# Patient Record
Sex: FEMALE
[De-identification: names, ages, dates, MRNs, and addresses within clinical notes are randomized per-mention and may not be internally consistent; named-entity substitution may affect disease eponyms.]

---

## 2020-03-27 ENCOUNTER — HOSPITAL ENCOUNTER (INPATIENT)
Dept: HOSPITAL 25 - MCHOBOUT | Age: 30
LOS: 2 days | Discharge: HOME | DRG: 560 | End: 2020-03-29
Attending: MIDWIFE | Admitting: MIDWIFE
Payer: COMMERCIAL

## 2020-03-27 DIAGNOSIS — O32.6XX0: ICD-10-CM

## 2020-03-27 DIAGNOSIS — O48.0: Primary | ICD-10-CM

## 2020-03-27 DIAGNOSIS — Z3A.40: ICD-10-CM

## 2020-03-27 LAB
BASOPHILS # BLD AUTO: 0 10^3/UL (ref 0–0.2)
EOSINOPHIL # BLD AUTO: 0.1 10^3/UL (ref 0–0.6)
HCT VFR BLD AUTO: 39 % (ref 35–47)
HGB BLD-MCNC: 13.1 G/DL (ref 12–16)
LYMPHOCYTES # BLD AUTO: 1.3 10^3/UL (ref 1–4.8)
MCH RBC QN AUTO: 31 PG (ref 27–31)
MCHC RBC AUTO-ENTMCNC: 34 G/DL (ref 31–36)
MCV RBC AUTO: 91 FL (ref 80–97)
MONOCYTES # BLD AUTO: 0.6 10^3/UL (ref 0–0.8)
NEUTROPHILS # BLD AUTO: 7.6 10^3/UL (ref 1.5–7.7)
NRBC # BLD AUTO: 0 10^3/UL
NRBC BLD QL AUTO: 0
PLATELET # BLD AUTO: 171 10^3/UL (ref 150–450)
RBC # BLD AUTO: 4.29 10^6 /UL (ref 3.7–4.87)
WBC # BLD AUTO: 9.5 10^3/UL (ref 3.5–10.8)

## 2020-03-27 PROCEDURE — 86901 BLOOD TYPING SEROLOGIC RH(D): CPT

## 2020-03-27 PROCEDURE — 86850 RBC ANTIBODY SCREEN: CPT

## 2020-03-27 PROCEDURE — G0480 DRUG TEST DEF 1-7 CLASSES: HCPCS

## 2020-03-27 PROCEDURE — 86900 BLOOD TYPING SEROLOGIC ABO: CPT

## 2020-03-27 PROCEDURE — 80307 DRUG TEST PRSMV CHEM ANLYZR: CPT

## 2020-03-27 PROCEDURE — 85025 COMPLETE CBC W/AUTO DIFF WBC: CPT

## 2020-03-27 PROCEDURE — 0KQM0ZZ REPAIR PERINEUM MUSCLE, OPEN APPROACH: ICD-10-PCS | Performed by: MIDWIFE

## 2020-03-27 PROCEDURE — 36415 COLL VENOUS BLD VENIPUNCTURE: CPT

## 2020-03-27 RX ADMIN — SODIUM CHLORIDE, SODIUM LACTATE, POTASSIUM CHLORIDE, AND CALCIUM CHLORIDE SCH MLS/HR: 600; 310; 30; 20 INJECTION, SOLUTION INTRAVENOUS at 16:14

## 2020-03-27 RX ADMIN — SODIUM CHLORIDE, SODIUM LACTATE, POTASSIUM CHLORIDE, AND CALCIUM CHLORIDE SCH MLS/HR: 600; 310; 30; 20 INJECTION, SOLUTION INTRAVENOUS at 20:34

## 2020-03-27 NOTE — HP
General Information





- Reason for Visit


Contractions





- General Information


Maternal Age: 29


Grav: 3


Para: 1


SAB: 1


IEA: 0





Estimated Due Date: 20


Determined By: Early Ultrasound


Gestational Age in Weeks/Days: 40 6/7 weeks


Maternal Blood Type and Rh: O Positive





- Results this Pregnancy


Serology/RPR Result: Non-Reactive


Rubella Result: Immune


HBsAg Result: Negative


HIV Result: Negative


GBS Culture Result: Negative





Past Medical History


Delivery History: Hx Uncomplicated Vaginal Delivery


Pertinent Past Medical History: Non-Contributory


Pertinent Past Surgical History: None


Pertinent Family History: Non-Contributory





- Antepartal Records


Antepartal Records: Reviewed, Pregnancy Complicated by: - ESL (good language 

skills), Varicella nonimmune





Review of Systems


Constitutional: Uncomfortable


CV Complaint: No


Respiratory: Shortness of Breath: No


Gastrointestinal: No Nausea/Vomiting, Normal Bowel Movement


Genitourinary: No Dysuria, No Leaking Fluid


Musculoskeletal: Back Pain, Contractions


Neurological: No Headache, No Visual Changes


Fetal Movement: Normal





Exam


Allergies/Adverse Reactions: 


Allergies





No Known Allergies Allergy (Verified 20 15:01)


 








B/P: 109/72, P: 85, R: 18, T: 97.6





- Measurements


Height: 5 ft 6.14 in


Weight: 168 lb


Weight in lbs: 168.028251


Body Mass Index (BMI): 27.0


Pre-Pregnancy Weight: 130 lb


Weight Gained This Pregnancy: 38 lbs and 0 ozs





- Exam


Breast: Breast Exam Deferred


CVA: No CVA Tenderness


Extremities: No Edema


Heart: Normal Rhythm/Heart Sounds


HEENT: No Significant Findings


Lungs: Clear Bilaterally


Reflexes: DTR 2+


Thyroid: No Thyromegaly





- Abdominal Exam


Abdomen Exam: Non-Tender, Fundal Height Consistent with Dates





- Ultrasound/Biophysical Profile


Ultrasound Status: Not Done





Targeted Exam Findings


See L&D Outpatient Visit Provider Note for Findings: N/A


Estimated Fetal Weight: 7 lb by leopold's


Cervical Exam: 4cm


Effacement: 80%


Station: -1


Presenting Part: Vertex


Membrane Status: Intact


Bleeding/Discharge: Bloody Show





EFM Findings





- External Fetal Monitor Findings


Baseline Fetal Heart Rate: 135


External Fetal Monitor Findings: Accelerations Present, No Pattern of Variable 

or Late Decelerations, Variability Moderate, Baseline Stable


Contractions: Regular, Moderate, 45-90 Seconds


Contraction Frequency: 2-5 min





Assessment/Plan





- Assessment


A: IUP at 40 6/7 weeks


     Category I FHR, no evidence of fetal metabolic acidemia


     GBS negative


     Labor





P: Desires epidural, will draw labs and start IV for bolus


    Plan to page anesthesiologist once labs return


    Reassess PRN


    Anticipate SVB





- Plan


Plan: Admit - Anticipate Vaginal Delivery





- Date/Time of Admission


Date of Admission: 20


Time of Admission: 15:30

## 2020-03-28 LAB
BASOPHILS # BLD AUTO: 0.1 10^3/UL (ref 0–0.2)
EOSINOPHIL # BLD AUTO: 0 10^3/UL (ref 0–0.6)
HCT VFR BLD AUTO: 36 % (ref 35–47)
HGB BLD-MCNC: 11.9 G/DL (ref 12–16)
LYMPHOCYTES # BLD AUTO: 1.1 10^3/UL (ref 1–4.8)
MCH RBC QN AUTO: 31 PG (ref 27–31)
MCHC RBC AUTO-ENTMCNC: 33 G/DL (ref 31–36)
MCV RBC AUTO: 92 FL (ref 80–97)
MONOCYTES # BLD AUTO: 0.9 10^3/UL (ref 0–0.8)
NEUTROPHILS # BLD AUTO: 12.2 10^3/UL (ref 1.5–7.7)
NRBC # BLD AUTO: 0 10^3/UL
NRBC BLD QL AUTO: 0
PLATELET # BLD AUTO: 143 10^3/UL (ref 150–450)
RBC # BLD AUTO: 3.88 10^6 /UL (ref 3.7–4.87)
WBC # BLD AUTO: 14.3 10^3/UL (ref 3.5–10.8)

## 2020-03-28 RX ADMIN — IBUPROFEN SCH MG: 600 TABLET, FILM COATED ORAL at 18:32

## 2020-03-28 RX ADMIN — IBUPROFEN SCH: 600 TABLET, FILM COATED ORAL at 18:33

## 2020-03-28 RX ADMIN — IBUPROFEN SCH MG: 600 TABLET, FILM COATED ORAL at 01:09

## 2020-03-28 RX ADMIN — IBUPROFEN SCH: 600 TABLET, FILM COATED ORAL at 07:58

## 2020-03-28 RX ADMIN — DOCUSATE SODIUM SCH MG: 100 CAPSULE, LIQUID FILLED ORAL at 07:59

## 2020-03-28 RX ADMIN — DOCUSATE SODIUM SCH MG: 100 CAPSULE, LIQUID FILLED ORAL at 21:04

## 2020-03-28 RX ADMIN — DOCUSATE SODIUM SCH MG: 100 CAPSULE, LIQUID FILLED ORAL at 14:35

## 2020-03-28 NOTE — PROCNOTE
[facility] OB: Delivery Note





- Delivery


  ** A


Date of Birth: [birth year]


Time of Birth: 23:12


 Sex: Female


 Weight at Birth: 7 lb 10.224 oz


Apgar Score 1 Minute: 9


Apgar Score 5 Minutes: 9


Gestational Age in Weeks and Days at Delivery: 40 Weeks and 6 Days


Delivery Method: Spontaneous Vaginal


Labor: Spontaneous


Did Patient attempt ?: N/A, No Previous 


Amniotic Fluid: Clear


Estimated Blood Loss: 450


Anesthesia/Analgesia: CEI for Labor


Anesthesia Comment: Dr. Cam


Delivered By: Bruna Altman





- Nursery


Level of Nursery: Regular/Bedside





- Perineum


Perineal Injury: Right Mediolateral, 2nd Degree


Perineal Injury Comment: repaired under lidocaine infiltration with 3-0 vicryl 

rapide


Perineal Repair: By Delivering Practioner





- Events


Delivery Events of Note: Pitocin Only After Delivery, Supplemental O2 to Mother

, Post-Partum Bleeding - Meds Given





- Additional Delivery Notes


Additional Delivery Notes: 


 in spontaneous labor received CEI per preference with moderate relief.  

Progressed to complete and complete.  Began pushing at 1853 with poor maternal 

effort, unable to feel pressure of infant head. Pushing discontinued at . 

Weiwei labored down until , then pushing resumed with good fetal descent.  

Slow, controlled delivery of fetal head OA to EMILY at 2312, compound right hand.

  Posterior shoulder followed with strong maternal effort, female infant 

delivered to maternal abdomen.  HR>110, infant vigorous with spontaneous cry.  

Apgars 9 and 9.  Cord doubly clamped and cut x 2 by midwife after pulsations 

ceased.  IV pitocin initiated at 150 cc for active management of third stage.  

Intact placenta delivered via tomy at 2319, noted to be bi-lobed.  Brisk 

bleeding noted; pitocin increased to 250 cc and fundus firm with massage.  

Vagina and perineum inspected, second degree right mediolateral laceration noted

, repair as above.  Bleeding again noted during repair; misoprostol 800 mcg 

given MT.  Mother and infant stable at time of note; infant to breastfeed.  EBL 

= 450 cc

## 2020-03-29 VITALS — DIASTOLIC BLOOD PRESSURE: 60 MMHG | SYSTOLIC BLOOD PRESSURE: 100 MMHG

## 2020-03-29 RX ADMIN — IBUPROFEN SCH: 600 TABLET, FILM COATED ORAL at 07:27

## 2020-03-29 RX ADMIN — DOCUSATE SODIUM SCH MG: 100 CAPSULE, LIQUID FILLED ORAL at 09:25

## 2020-03-29 RX ADMIN — IBUPROFEN SCH: 600 TABLET, FILM COATED ORAL at 09:00

## 2020-03-29 NOTE — PTEDU
Patient Name: TABATHA BALL

MRN: [id number]



TABATHA BALL selected video: Follow Me Mum: The Key to Successful Breastfeeding to view on 03/29/2020
 at 9:20:13 AM [email or web address]

## 2022-09-26 NOTE — PN
Progress Note





- Progress Note


Date of Service: 03/27/20


Note: 


Called to room to assess baseline change in FHR.  Fetal baseline 160-170 for 

one hour.  Moderate variability, no decelerations.  Yanet reports minimal 

relief from CEI, has pushed bolus button three times.  Anesthesiologist on unit 

and aware.  Category II FHR.  VE: 7/90/-1, bulging bag of water.  Will consider 

controlled AROM
Abdomen soft, non-tender and non-distended, no rebound, no guarding and no masses. no hepatosplenomegaly.